# Patient Record
Sex: MALE | Employment: STUDENT | URBAN - METROPOLITAN AREA
[De-identification: names, ages, dates, MRNs, and addresses within clinical notes are randomized per-mention and may not be internally consistent; named-entity substitution may affect disease eponyms.]

---

## 2018-06-22 ENCOUNTER — HOSPITAL ENCOUNTER (OUTPATIENT)
Dept: GENERAL RADIOLOGY | Age: 23
Discharge: HOME | End: 2018-06-24
Payer: COMMERCIAL

## 2018-06-22 ENCOUNTER — HOSPITAL ENCOUNTER (OUTPATIENT)
Age: 23
Discharge: HOME | End: 2018-06-24
Payer: COMMERCIAL

## 2018-06-22 DIAGNOSIS — R76.11 NONSPECIFIC REACTION TO TUBERCULIN TEST: ICD-10-CM

## 2018-06-22 PROCEDURE — 71046 X-RAY EXAM CHEST 2 VIEWS: CPT

## 2021-02-28 ENCOUNTER — OFFICE VISIT (OUTPATIENT)
Dept: PRIMARY CARE CLINIC | Age: 26
End: 2021-02-28

## 2021-02-28 VITALS
HEART RATE: 114 BPM | BODY MASS INDEX: 36.18 KG/M2 | WEIGHT: 273 LBS | OXYGEN SATURATION: 98 % | SYSTOLIC BLOOD PRESSURE: 122 MMHG | TEMPERATURE: 97.1 F | HEIGHT: 73 IN | DIASTOLIC BLOOD PRESSURE: 74 MMHG

## 2021-02-28 DIAGNOSIS — R51.9 ACUTE NONINTRACTABLE HEADACHE, UNSPECIFIED HEADACHE TYPE: Primary | ICD-10-CM

## 2021-02-28 DIAGNOSIS — Z20.822 CLOSE EXPOSURE TO COVID-19 VIRUS: ICD-10-CM

## 2021-02-28 LAB
Lab: NORMAL
PERFORMING INSTRUMENT: NORMAL
QC PASS/FAIL: NORMAL
SARS-COV-2, POC: NORMAL

## 2021-02-28 PROCEDURE — 99213 OFFICE O/P EST LOW 20 MIN: CPT | Performed by: NURSE PRACTITIONER

## 2021-02-28 PROCEDURE — 87426 SARSCOV CORONAVIRUS AG IA: CPT | Performed by: NURSE PRACTITIONER

## 2021-02-28 RX ORDER — IBUPROFEN 800 MG/1
TABLET ORAL
COMMUNITY
Start: 2021-02-12 | End: 2021-04-04 | Stop reason: ALTCHOICE

## 2021-02-28 NOTE — PROGRESS NOTES
Chief Complaint   Headache and Concern For COVID-19 (+ exposure)    History of Present Illness   Source of history provided by:  patient. Milagros Lopez is a 30 y.o. old male who presents to express care with complaints of Headache and Patient was exposed to someone who is a known Covid-19 infected person or directly caring for such person x 2 days. States symptoms have improved since onset. Has been taking Ibuprofen with symptomatic relief. Currently denies any Fever, Cough, Shortness of breath, Nausea, Vomiting, Chest Pain, Abdominal Pain, Rash or Lethargy. Denies any hx of asthma. Denies tobacco use. ROS   Pertinent positives and negatives are stated within HPI, all other systems reviewed and are negative. Past Medical History:  has a past medical history of Emerson-Parkinson-White (WPW) syndrome. Past Surgical History:  has a past surgical history that includes Atrial ablation surgery. Social History:  reports that he has never smoked. He has never used smokeless tobacco. He reports current alcohol use. He reports that he does not use drugs. Family History: family history is not on file. Allergies: Patient has no known allergies. Physical Exam   Vital Signs:  /74   Pulse 114   Temp 97.1 °F (36.2 °C)   Ht 6' 1\" (1.854 m)   Wt 273 lb (123.8 kg)   SpO2 98%   BMI 36.02 kg/m²    Oxygen Saturation Interpretation: Normal.    Constitutional:  Alert, development consistent with age. NAD. Head:  NC/NT. Airway patent. Ears: TMs clear bilaterally. Canals without exudate or swelling bilaterally. Mouth: Posterior pharynx with mild erythema and clear postnasal drip. No tonsillar hypertrophy or exudate. Neck:  Normal ROM. Supple. No anterior cervical adenopathy noted. Lungs: CTAB without wheezes, rales, or rhonchi. CV:  Tachycardia with regular rhythm, normal heart sounds, without pathological murmurs, ectopy, gallops, or rubs. Skin:  Normal turgor. Warm, dry, without visible rash. Lymphatic: No lymphangitis or adenopathy noted. Neurological:  Oriented. Motor functions intact. Psych: Moderately anxious     Lab / Imaging Results   (All laboratory and radiology results have been personally reviewed by myself)  Labs:  Results for orders placed or performed in visit on 02/28/21   POCT COVID-19, Antigen   Result Value Ref Range    SARS-COV-2, POC Not-Detected Not Detected    Lot Number [id number]     QC Pass/Fail pass     Performing Instrument BD Veritor        Imaging: All Radiology results interpreted by Radiologist unless otherwise noted. No results found. Medical Decision Making   Pt non-toxic, in no apparent distress and stable at time of discharge. Assessment/Plan   Bishop Zhu was seen today for headache and concern for covid-19. Diagnoses and all orders for this visit:    Acute nonintractable headache, unspecified headache type        - Continue Ibuprofen as needed     Close exposure to COVID-19 virus  -     POCT COVID-19, Antigen  -     COVID-19; Future    Radid COVID negative. PCR COVID-19 swab obtained and pending, will call with results once available. Advised self-quarantine at home in the interim. Increase fluids and rest. Symptomatic relief discussed including Tylenol prn pain/fever. Schedule f/u with PCP in 7-10 days if symptoms persist. ED sooner if symptoms worsen or change. ED immediately with high or refractory fever, progressive SOB, dyspnea, CP, calf pain/swelling, shaking chills, vomiting, abdominal pain, lethargy, flank pain, or decreased urinary output. Pt verbalizes understanding and is in agreement with plan of care. All questions answered. Jong Ramos, APRN - CNP    This visit was provided as a focused evaluation during the COVID-19 pandemic/national emergency. A comprehensive review of all previous patient history and testing was not conducted. Pertinent findings were elicited during the visit. *NOTE: This report was transcribed using voice recognition software. Every effort was made to ensure accuracy; however, inadvertent computerized transcription errors may be present.

## 2021-03-01 DIAGNOSIS — Z20.822 CLOSE EXPOSURE TO COVID-19 VIRUS: ICD-10-CM

## 2021-03-01 LAB — SARS-COV-2, PCR: NOT DETECTED

## 2021-04-04 ENCOUNTER — APPOINTMENT (OUTPATIENT)
Dept: CT IMAGING | Age: 26
End: 2021-04-04
Payer: COMMERCIAL

## 2021-04-04 ENCOUNTER — HOSPITAL ENCOUNTER (EMERGENCY)
Age: 26
Discharge: HOME | End: 2021-04-04
Attending: FAMILY MEDICINE
Payer: COMMERCIAL

## 2021-04-04 VITALS
HEIGHT: 72 IN | TEMPERATURE: 96.6 F | OXYGEN SATURATION: 97 % | HEART RATE: 78 BPM | DIASTOLIC BLOOD PRESSURE: 79 MMHG | RESPIRATION RATE: 18 BRPM | BODY MASS INDEX: 36.57 KG/M2 | WEIGHT: 270 LBS | SYSTOLIC BLOOD PRESSURE: 122 MMHG

## 2021-04-04 DIAGNOSIS — R10.11 ABDOMINAL PAIN, RIGHT UPPER QUADRANT: Primary | ICD-10-CM

## 2021-04-04 LAB
ALBUMIN SERPL-MCNC: 4.4 G/DL (ref 3.5–5.2)
ALP BLD-CCNC: 83 U/L (ref 40–129)
ALT SERPL-CCNC: 69 U/L (ref 0–40)
ANION GAP SERPL CALCULATED.3IONS-SCNC: 12 MMOL/L (ref 7–16)
AST SERPL-CCNC: 31 U/L (ref 0–39)
BASOPHILS ABSOLUTE: 0.05 E9/L (ref 0–0.2)
BASOPHILS RELATIVE PERCENT: 0.6 % (ref 0–2)
BILIRUB SERPL-MCNC: 0.4 MG/DL (ref 0–1.2)
BILIRUBIN URINE: NEGATIVE
BLOOD, URINE: NEGATIVE
BUN BLDV-MCNC: 16 MG/DL (ref 6–20)
CALCIUM SERPL-MCNC: 8.9 MG/DL (ref 8.6–10.2)
CHLORIDE BLD-SCNC: 100 MMOL/L (ref 98–107)
CLARITY: CLEAR
CO2: 25 MMOL/L (ref 22–29)
COLOR: YELLOW
CREAT SERPL-MCNC: 1 MG/DL (ref 0.7–1.2)
EOSINOPHILS ABSOLUTE: 0.18 E9/L (ref 0.05–0.5)
EOSINOPHILS RELATIVE PERCENT: 2.2 % (ref 0–6)
GFR AFRICAN AMERICAN: >60
GFR NON-AFRICAN AMERICAN: >60 ML/MIN/1.73
GLUCOSE BLD-MCNC: 95 MG/DL (ref 74–99)
GLUCOSE URINE: NEGATIVE MG/DL
HCT VFR BLD CALC: 38.5 % (ref 37–54)
HEMOGLOBIN: 13.3 G/DL (ref 12.5–16.5)
IMMATURE GRANULOCYTES #: 0.02 E9/L
IMMATURE GRANULOCYTES %: 0.2 % (ref 0–5)
KETONES, URINE: ABNORMAL MG/DL
LACTIC ACID: 1.2 MMOL/L (ref 0.5–2.2)
LEUKOCYTE ESTERASE, URINE: NEGATIVE
LIPASE: 40 U/L (ref 13–60)
LYMPHOCYTES ABSOLUTE: 3.43 E9/L (ref 1.5–4)
LYMPHOCYTES RELATIVE PERCENT: 42.1 % (ref 20–42)
MCH RBC QN AUTO: 29.7 PG (ref 26–35)
MCHC RBC AUTO-ENTMCNC: 34.5 % (ref 32–34.5)
MCV RBC AUTO: 85.9 FL (ref 80–99.9)
MONOCYTES ABSOLUTE: 1.1 E9/L (ref 0.1–0.95)
MONOCYTES RELATIVE PERCENT: 13.5 % (ref 2–12)
NEUTROPHILS ABSOLUTE: 3.37 E9/L (ref 1.8–7.3)
NEUTROPHILS RELATIVE PERCENT: 41.4 % (ref 43–80)
NITRITE, URINE: NEGATIVE
PDW BLD-RTO: 12.2 FL (ref 11.5–15)
PH UA: 6 (ref 5–9)
PLATELET # BLD: 296 E9/L (ref 130–450)
PMV BLD AUTO: 10.3 FL (ref 7–12)
POTASSIUM SERPL-SCNC: 3.7 MMOL/L (ref 3.5–5)
PROTEIN UA: NEGATIVE MG/DL
RBC # BLD: 4.48 E12/L (ref 3.8–5.8)
SODIUM BLD-SCNC: 137 MMOL/L (ref 132–146)
SPECIFIC GRAVITY UA: 1.02 (ref 1–1.03)
TOTAL PROTEIN: 7.4 G/DL (ref 6.4–8.3)
UROBILINOGEN, URINE: 0.2 E.U./DL
WBC # BLD: 8.2 E9/L (ref 4.5–11.5)

## 2021-04-04 PROCEDURE — 83690 ASSAY OF LIPASE: CPT

## 2021-04-04 PROCEDURE — 99283 EMERGENCY DEPT VISIT LOW MDM: CPT

## 2021-04-04 PROCEDURE — C9113 INJ PANTOPRAZOLE SODIUM, VIA: HCPCS | Performed by: FAMILY MEDICINE

## 2021-04-04 PROCEDURE — 2580000003 HC RX 258: Performed by: FAMILY MEDICINE

## 2021-04-04 PROCEDURE — 36415 COLL VENOUS BLD VENIPUNCTURE: CPT

## 2021-04-04 PROCEDURE — 6360000002 HC RX W HCPCS: Performed by: FAMILY MEDICINE

## 2021-04-04 PROCEDURE — 81003 URINALYSIS AUTO W/O SCOPE: CPT

## 2021-04-04 PROCEDURE — 96374 THER/PROPH/DIAG INJ IV PUSH: CPT

## 2021-04-04 PROCEDURE — 6370000000 HC RX 637 (ALT 250 FOR IP): Performed by: FAMILY MEDICINE

## 2021-04-04 PROCEDURE — 83605 ASSAY OF LACTIC ACID: CPT

## 2021-04-04 PROCEDURE — 80053 COMPREHEN METABOLIC PANEL: CPT

## 2021-04-04 PROCEDURE — 85025 COMPLETE CBC W/AUTO DIFF WBC: CPT

## 2021-04-04 PROCEDURE — 74176 CT ABD & PELVIS W/O CONTRAST: CPT

## 2021-04-04 RX ORDER — PANTOPRAZOLE SODIUM 40 MG/10ML
40 INJECTION, POWDER, LYOPHILIZED, FOR SOLUTION INTRAVENOUS ONCE
Status: COMPLETED | OUTPATIENT
Start: 2021-04-04 | End: 2021-04-04

## 2021-04-04 RX ORDER — LIDOCAINE HYDROCHLORIDE 20 MG/ML
5 SOLUTION OROPHARYNGEAL
Qty: 100 ML | Refills: 0 | Status: SHIPPED | OUTPATIENT
Start: 2021-04-04 | End: 2022-03-31

## 2021-04-04 RX ORDER — SUCRALFATE 1 G/1
1 TABLET ORAL 2 TIMES DAILY
Qty: 60 TABLET | Refills: 0 | Status: SHIPPED | OUTPATIENT
Start: 2021-04-04 | End: 2022-03-31

## 2021-04-04 RX ORDER — 0.9 % SODIUM CHLORIDE 0.9 %
1000 INTRAVENOUS SOLUTION INTRAVENOUS ONCE
Status: COMPLETED | OUTPATIENT
Start: 2021-04-04 | End: 2021-04-04

## 2021-04-04 RX ORDER — ACETAMINOPHEN 500 MG
1000 TABLET ORAL EVERY 8 HOURS PRN
Qty: 50 TABLET | Refills: 0 | Status: SHIPPED | OUTPATIENT
Start: 2021-04-04 | End: 2022-03-31

## 2021-04-04 RX ORDER — IBUPROFEN 400 MG/1
400 TABLET ORAL EVERY 8 HOURS PRN
Qty: 12 TABLET | Refills: 0 | Status: SHIPPED | OUTPATIENT
Start: 2021-04-04 | End: 2022-03-31 | Stop reason: ALTCHOICE

## 2021-04-04 RX ORDER — PANTOPRAZOLE SODIUM 40 MG/1
40 TABLET, DELAYED RELEASE ORAL
Qty: 30 TABLET | Refills: 0 | Status: SHIPPED | OUTPATIENT
Start: 2021-04-04 | End: 2022-07-20 | Stop reason: SDUPTHER

## 2021-04-04 RX ADMIN — SODIUM CHLORIDE 1000 ML: 9 INJECTION, SOLUTION INTRAVENOUS at 07:38

## 2021-04-04 RX ADMIN — PANTOPRAZOLE SODIUM 40 MG: 40 INJECTION, POWDER, FOR SOLUTION INTRAVENOUS at 07:38

## 2021-04-04 RX ADMIN — LIDOCAINE HYDROCHLORIDE: 20 SOLUTION ORAL; TOPICAL at 07:38

## 2021-04-04 RX ADMIN — SODIUM CHLORIDE 1000 ML: 9 INJECTION, SOLUTION INTRAVENOUS at 08:42

## 2021-04-04 ASSESSMENT — PAIN DESCRIPTION - PAIN TYPE: TYPE: ACUTE PAIN

## 2021-04-04 ASSESSMENT — PAIN DESCRIPTION - LOCATION: LOCATION: ABDOMEN

## 2021-04-04 ASSESSMENT — PAIN SCALES - GENERAL: PAINLEVEL_OUTOF10: 3

## 2021-04-04 ASSESSMENT — PAIN DESCRIPTION - ORIENTATION: ORIENTATION: RIGHT;UPPER

## 2021-04-04 NOTE — ED PROVIDER NOTES
[address] [facility]  Department of Emergency Medicine   ED  Encounter Note  Admit Date/RoomTime: 2021  6:58 AM  ED Room:     NAME: Sarika Gill  : 1995  MRN: [id number]     Chief Complaint:  Abdominal Pain (left upper abd pain since yesterday. stool has been black x 2-3 days. has been taking ibuprofen for root canal.  no vomiting)    History of Present Illness        Sarika Gill is a 30 y.o. old male who presents to the emergency department by private vehicle, for gradual onset aching pain in the RUQ without radiation which began 2 day(s) prior to arrival.  There has been no similar episodes in the past however patient does admit he has had heartburn in the past and even before taking ibuprofen 800 which he takes 3-4 times a day since mid March. Patient has noticed black stools in the past 2 to 3 days no nausea vomiting or diarrhea no chest pain. No cough fever chills. .   Since onset the symptoms have been persistent. The pain is associated with nothing additional.  The pain is aggravated by eating, NSAIDs and pressure on area of discomfort and relieved by nothing. There has been NO back pain, dysuria, hematuria or vomiting. Rebekah Yao ROS   Pertinent positives and negatives are stated within HPI, all other systems reviewed and are negative. Past Medical History:  has a past medical history of Emerson-Parkinson-White (WPW) syndrome. Surgical History:  has a past surgical history that includes Atrial ablation surgery. Social History:  reports that he has never smoked. He has never used smokeless tobacco. He reports current alcohol use. He reports that he does not use drugs. Family History: family history is not on file. Allergies: Patient has no known allergies.     Physical Exam   Oxygen Saturation Interpretation: Normal.        ED Triage Vitals   BP Temp Temp Source Pulse Resp SpO2 Height Weight   21 0703 21 0703 21 0703 21 0703 21 0703 04/04/21 0703 04/04/21 0701 04/04/21 0701   124/65 96.6 °F (35.9 °C) Temporal 86 16 98 % 6' (1.829 m) 270 lb (122.5 kg)         General Appearance/Constitutional:  Alert, development consistent with age. HEENT:  NC/NT. PERRLA. Airway patent. Neck:  Supple. No lymphadenopathy. Respiratory: Lungs Clear to auscultation and breath sounds equal.  CV:  Regular rate and rhythm. GI:  normal appearing, non-distended with no visible hernias. Bowel sounds: normal bowel sounds. Tenderness: There is mild tenderness present - located in the RUQ., There is no rebound tenderness. , There is no guarding. , There is no distension. .           Liver: non-palpable. Spleen:  non-tender. Rectal: No hemorrhoids no fissures no masses examined guaiac negative no bloody or dark stool noted. Back: CVA Tenderness: No.  Integument:  Normal turgor. Warm, dry, without visible rash, unless noted elsewhere. Neurological:  Orientation age-appropriate. Motor functions intact.     Lab / Imaging Results   (All laboratory and radiology results have been personally reviewed by myself)  Labs:  Results for orders placed or performed during the hospital encounter of 04/04/21   CBC Auto Differential   Result Value Ref Range    WBC 8.2 4.5 - 11.5 E9/L    RBC 4.48 3.80 - 5.80 E12/L    Hemoglobin 13.3 12.5 - 16.5 g/dL    Hematocrit 38.5 37.0 - 54.0 %    MCV 85.9 80.0 - 99.9 fL    MCH 29.7 26.0 - 35.0 pg    MCHC 34.5 32.0 - 34.5 %    RDW 12.2 11.5 - 15.0 fL    Platelets 502 166 - 033 E9/L    MPV 10.3 7.0 - 12.0 fL    Neutrophils % 41.4 (L) 43.0 - 80.0 %    Immature Granulocytes % 0.2 0.0 - 5.0 %    Lymphocytes % 42.1 (H) 20.0 - 42.0 %    Monocytes % 13.5 (H) 2.0 - 12.0 %    Eosinophils % 2.2 0.0 - 6.0 %    Basophils % 0.6 0.0 - 2.0 %    Neutrophils Absolute 3.37 1.80 - 7.30 E9/L    Immature Granulocytes # 0.02 E9/L    Lymphocytes Absolute 3.43 1.50 - 4.00 E9/L    Monocytes Absolute 1.10 (H) 0.10 - 0.95 E9/L inadvertent computerized transcription errors may be present.   END OF PROVIDER NOTE        Tunde Monsalve MD  04/04/21 [id number]

## 2021-08-27 ENCOUNTER — HOSPITAL ENCOUNTER (EMERGENCY)
Age: 26
Discharge: HOME | End: 2021-08-27
Attending: EMERGENCY MEDICINE
Payer: COMMERCIAL

## 2021-08-27 VITALS
SYSTOLIC BLOOD PRESSURE: 129 MMHG | RESPIRATION RATE: 18 BRPM | DIASTOLIC BLOOD PRESSURE: 82 MMHG | OXYGEN SATURATION: 98 % | HEART RATE: 74 BPM | TEMPERATURE: 97.6 F

## 2021-08-27 DIAGNOSIS — S61.012A LACERATION OF LEFT THUMB WITHOUT FOREIGN BODY WITHOUT DAMAGE TO NAIL, INITIAL ENCOUNTER: Primary | ICD-10-CM

## 2021-08-27 PROCEDURE — 90471 IMMUNIZATION ADMIN: CPT | Performed by: EMERGENCY MEDICINE

## 2021-08-27 PROCEDURE — 90715 TDAP VACCINE 7 YRS/> IM: CPT | Performed by: EMERGENCY MEDICINE

## 2021-08-27 PROCEDURE — 6360000002 HC RX W HCPCS: Performed by: EMERGENCY MEDICINE

## 2021-08-27 PROCEDURE — 99283 EMERGENCY DEPT VISIT LOW MDM: CPT

## 2021-08-27 RX ADMIN — TETANUS TOXOID, REDUCED DIPHTHERIA TOXOID AND ACELLULAR PERTUSSIS VACCINE, ADSORBED 0.5 ML: 5; 2.5; 8; 8; 2.5 SUSPENSION INTRAMUSCULAR at 02:46

## 2021-08-27 NOTE — ED PROVIDER NOTES
HPI:  8/27/21, Time: 2:25 AM EDT         Devin Hernandez is a 27 y.o. male presenting to the ED for left thumb laceration occurring on Sunday. Patient states that he accidentally cut his thumb on a mirror. He did not seek medical treatment at that time. States that the wound has been healing well, but he came to the ED because he needs a tetanus vaccine. States he has minimal pain, no drainage, and no fevers. No numbness or tingling. Review of Systems:   Pertinent positives and negatives are stated within HPI, all other systems reviewed and are negative.          --------------------------------------------- PAST HISTORY ---------------------------------------------  Past Medical History:  has a past medical history of Emerson-Parkinson-White (WPW) syndrome. Past Surgical History:  has a past surgical history that includes Atrial ablation surgery. Social History:  reports that he has never smoked. He has never used smokeless tobacco. He reports current alcohol use. He reports that he does not use drugs. Family History: family history is not on file. The patients home medications have been reviewed. Allergies: Patient has no known allergies. -------------------------------------------------- RESULTS -------------------------------------------------  All laboratory and radiology results have been personally reviewed by myself   LABS:  No results found for this visit on 08/27/21. RADIOLOGY:  Interpreted by Radiologist.  No orders to display       ------------------------- NURSING NOTES AND VITALS REVIEWED ---------------------------   The nursing notes within the ED encounter and vital signs as below have been reviewed.    /76   Pulse 80   Temp 97.6 °F (36.4 °C) (Temporal)   Resp 16   SpO2 98%   Oxygen Saturation Interpretation: Normal      ---------------------------------------------------PHYSICAL EXAM--------------------------------------      Constitutional/General: Alert and oriented x3, well appearing, non toxic in NAD  Head: Normocephalic and atraumatic  Mouth: Oropharynx clear, handling secretions, no trismus  Neck: Supple, full ROM,   Pulmonary: Not in respiratory distress  Extremities: Moves all extremities x 4. Warm and well perfused. Left thumb-very small superficial laceration to palmar aspect of thumb which is healing well, no erythema or drainage, no crepitus, no palpable foreign body, good capillary refill, flexor and extensor tendons intact, normal sensation. Skin: warm and dry without rash  Neurologic: GCS 15, no focal motor or sensory deficits   Psych: Normal Affect. Behavior normal.      ------------------------------ ED COURSE/MEDICAL DECISION MAKING----------------------  Medications   Tetanus-Diphth-Acell Pertussis (BOOSTRIX) injection 0.5 mL (has no administration in time range)       Medical Decision Making/ED COURSE:   Patient is a 77-year-old male presenting to the ED for tetanus vaccine after sustaining a superficial laceration to his left thumb 4 days ago. On examination, there was no palpable foreign body and no evidence of infection. He will be given a tetanus vaccine. Supportive care measures and ED return precautions discussed. Patient remained hemodynamically stable throughout ED course. Counseling: The emergency provider has spoken with the patient and discussed todays results, in addition to providing specific details for the plan of care and counseling regarding the diagnosis and prognosis. Questions are answered at this time and they are agreeable with the plan.      --------------------------------- IMPRESSION AND DISPOSITION ---------------------------------    IMPRESSION  1. Laceration of left thumb without foreign body without damage to nail, initial encounter        DISPOSITION  Disposition: Discharge to home  Patient condition is stable      NOTE: This report was transcribed using voice recognition software.  Every effort was made to ensure accuracy; however, inadvertent computerized transcription errors may be present    Miley DIAZ MD, am the primary provider of this record       Miley Mendoza MD  08/27/21 [id number]

## 2022-03-31 ENCOUNTER — TELEPHONE (OUTPATIENT)
Dept: FAMILY MEDICINE CLINIC | Age: 27
End: 2022-03-31

## 2022-03-31 ENCOUNTER — OFFICE VISIT (OUTPATIENT)
Dept: FAMILY MEDICINE CLINIC | Age: 27
End: 2022-03-31
Payer: COMMERCIAL

## 2022-03-31 VITALS
RESPIRATION RATE: 20 BRPM | HEIGHT: 72 IN | SYSTOLIC BLOOD PRESSURE: 122 MMHG | OXYGEN SATURATION: 98 % | DIASTOLIC BLOOD PRESSURE: 87 MMHG | BODY MASS INDEX: 34.81 KG/M2 | WEIGHT: 257 LBS | HEART RATE: 100 BPM | TEMPERATURE: 97.6 F

## 2022-03-31 DIAGNOSIS — F41.9 ANXIETY: Primary | ICD-10-CM

## 2022-03-31 DIAGNOSIS — R74.8 ABNORMAL AST AND ALT: ICD-10-CM

## 2022-03-31 PROBLEM — E66.9 OBESITY, CLASS I, BMI 30-34.9: Status: ACTIVE | Noted: 2021-11-24

## 2022-03-31 PROBLEM — F41.0 PANIC ATTACKS: Status: ACTIVE | Noted: 2022-03-31

## 2022-03-31 PROBLEM — E66.811 OBESITY, CLASS I, BMI 30-34.9: Status: ACTIVE | Noted: 2021-11-24

## 2022-03-31 PROCEDURE — 99204 OFFICE O/P NEW MOD 45 MIN: CPT | Performed by: STUDENT IN AN ORGANIZED HEALTH CARE EDUCATION/TRAINING PROGRAM

## 2022-03-31 RX ORDER — FLUOXETINE HYDROCHLORIDE 20 MG/1
20 CAPSULE ORAL DAILY
Qty: 30 CAPSULE | Refills: 1 | Status: SHIPPED
Start: 2022-03-31 | End: 2022-03-31

## 2022-03-31 RX ORDER — HYDROXYZINE PAMOATE 25 MG/1
25 CAPSULE ORAL 3 TIMES DAILY PRN
Qty: 60 CAPSULE | Refills: 0 | Status: SHIPPED | OUTPATIENT
Start: 2022-03-31 | End: 2022-04-30

## 2022-03-31 RX ORDER — FLUOXETINE HYDROCHLORIDE 40 MG/1
40 CAPSULE ORAL DAILY
Qty: 30 CAPSULE | Refills: 3 | Status: SHIPPED
Start: 2022-03-31 | End: 2022-03-31

## 2022-03-31 SDOH — ECONOMIC STABILITY: FOOD INSECURITY: WITHIN THE PAST 12 MONTHS, YOU WORRIED THAT YOUR FOOD WOULD RUN OUT BEFORE YOU GOT MONEY TO BUY MORE.: NEVER TRUE

## 2022-03-31 SDOH — ECONOMIC STABILITY: FOOD INSECURITY: WITHIN THE PAST 12 MONTHS, THE FOOD YOU BOUGHT JUST DIDN'T LAST AND YOU DIDN'T HAVE MONEY TO GET MORE.: NEVER TRUE

## 2022-03-31 ASSESSMENT — PATIENT HEALTH QUESTIONNAIRE - PHQ9
SUM OF ALL RESPONSES TO PHQ QUESTIONS 1-9: 0
2. FEELING DOWN, DEPRESSED OR HOPELESS: 0
SUM OF ALL RESPONSES TO PHQ QUESTIONS 1-9: 0
1. LITTLE INTEREST OR PLEASURE IN DOING THINGS: 0
SUM OF ALL RESPONSES TO PHQ QUESTIONS 1-9: 0
SUM OF ALL RESPONSES TO PHQ QUESTIONS 1-9: 0
SUM OF ALL RESPONSES TO PHQ9 QUESTIONS 1 & 2: 0

## 2022-03-31 ASSESSMENT — ENCOUNTER SYMPTOMS
SHORTNESS OF BREATH: 0
ABDOMINAL PAIN: 0
TROUBLE SWALLOWING: 0

## 2022-03-31 ASSESSMENT — SOCIAL DETERMINANTS OF HEALTH (SDOH): HOW HARD IS IT FOR YOU TO PAY FOR THE VERY BASICS LIKE FOOD, HOUSING, MEDICAL CARE, AND HEATING?: NOT HARD AT ALL

## 2022-03-31 NOTE — PATIENT INSTRUCTIONS
Patient Education        Learning About Mindfulness for Stress  What are mindfulness and stress? Stress is what you feel when you have to handle more than you are used to. A lot of things can cause stress. You may feel stress when you go on a job interview, take a test, or run a race. This kind of short-term stress is normal and even useful. It can help you if you need to work hard or react quickly. Stress also can last a long time. Long-term stress is caused by stressful situations or events. Examples of long-term stress include long-term health problems, ongoing problems at work, and conflicts in your family. Long-term stress can harm your health. Mindfulness is a focus only on things happening in the present moment. It's a process of purposefully paying attention to and being aware of your surroundings, your emotions, your thoughts, and how your body feels. You are aware of these things, but you aren't judging these experiences as \"good\" or \"bad. \" Mindfulness can help you learn to calm your mind and body to help you cope with illness, pain, and stress. How does mindfulness help to relieve stress? Mindfulness can help quiet your mind and relax your body. Studies show that it can help some people sleep better, feel less anxious, and bring their blood pressure down. And it's been shown to help some people live and cope better with certain health problems like heart disease, depression, chronic pain, and cancer. How do you practice mindfulness? To be mindful is to pay attention, to be present, and to be accepting. · When you're mindful, you do just one thing and you pay close attention to that one thing. For example, you may sit quietly and notice your emotions or how your food tastes and smells. · When you're present, you focus on the things that are happening right now. You let go of your thoughts about the past and the future.  When you dwell on the past or the future, you miss moments that can heal and strengthen you. You may miss moments like hearing a child laugh or seeing a friendly face when you think you're all alone. · When you're accepting, you don't  the present moment. Instead you accept your thoughts and feelings as they come. You can practice anytime, anywhere, and in any way you choose. You can practice in many ways. Here are a few ideas:  · While doing your chores, like washing the dishes, let your mind focus on what's in your hand. What does the dish feel like? Is the water warm or cold? · Go outside and take a few deep breaths. What is the air like? Is it warm or cold? · When you can, take some time at the start of your day to sit alone and think. · Take a slow walk by yourself. Count your steps while you breathe in and out. · Try yoga breathing exercises, stretches, and poses to strengthen and relax your muscles. · At work, if you can, try to stop for a few moments each hour. Note how your body feels. Let yourself regroup and let your mind settle before you return to what you were doing. · If you struggle with anxiety or \"worry thoughts,\" imagine your mind as a blue rivas and your worry thoughts as clouds. Now imagine those worry thoughts floating across your mind's rivas. Just let them pass by as you watch. Follow-up care is a key part of your treatment and safety. Be sure to make and go to all appointments, and call your doctor if you are having problems. It's also a good idea to know your test results and keep a list of the medicines you take. Where can you learn more? Go to [email or web address]. org and sign in to your [employer] account. Enter Z668 in the [employer] box to learn more about \"Learning About Mindfulness for Stress. \"     If you do not have an account, please click on the \"Sign Up Now\" link. Current as of: August 31, 2020               Content Version: 12.9  © 3054-0163 Healthwise, Incorporated.    Care instructions adapted under license by [facility] [employer] [facility] ([facility]). If you have questions about a medical condition or this instruction, always ask your healthcare professional. [facility] any warranty or liability for your use of this information. Patient Education         Mindfulness: Breathing Practice (03:44)  Your health professional recommends that you watch this short online health video. Practice mindfulness to help reduce stress. Purpose:  Outlines how to practice mindfulness by focusing on breathing to help reduce stress. Goal:  The user will practice a mindfulness technique for stress reduction that focuses on breath awareness. How to watch the video    Scan the QR code   OR Visit the website    [email or web address]   Current as of: September 23, 2020               Content Version: 12.9  © 2006-2021 [employer]. Care instructions adapted under license by [facility] [employer] [facility] ([facility]). If you have questions about a medical condition or this instruction, always ask your healthcare professional. [facility] [address] any warranty or liability for your use of this information. Learning About Guided Imagery for Stress and Trouble Sleeping  What are guided imagery and stress? Stress is what you feel when you have to handle more than you're used to. A lot of things can cause stress. You may feel stress when you go on a job interview, take a test, or run a race. This kind of short-term stress is normal and even useful. It can help you if you need to work hard or react quickly. Stress also can last a long time. Long-term stress is caused by stressful situations or events. Examples include long-term health problems, ongoing problems at work, and conflicts in your family. Long-term stress can harm your health. Guided imagery is a technique of directed thoughts and suggestions that guide your mind toward a relaxed, focused state.  This technique helps you use your mind to take you to a calm, peaceful place. You can use it to relax, which can lower blood pressure and reduce other problems related to stress. How does guided imagery help to relieve stress? Because of the way the mind and body are connected, guided imagery can make you feel like you are experiencing something just by imagining it. You can achieve a relaxed state when you imagine all the details of a safe, comfortable place, such as a beach or a garden. This relaxed state may help you feel more in control of your emotions and thought processes. Feeling in control may improve your attitude, health, and sense of well-being. How do you do guided imagery? You can use a smartphone kasey or a video to lead you through the process. You use all of your senses in guided imagery. For example, if you want a tropical setting, you can imagine the warm breeze on your skin, the bright blue of the water, the sound of the surf, the sweet scent of tropical flowers, and the taste of coconut. Imagining those things can make you actually feel like you're there. But you don't have to imagine the tropics to feel peace. Guided imagery can take you to your own restful place. To give guided imagery a try, follow these steps:  1. Lean back comfortably in your chair. If you can, close your eyes. Put your arms on the armrests, or fold your hands in your lap. 2. Take a deep breath through your nose. Breathe in slowly, and then let the air out completely through your mouth. 3. Do it again slowly. Keep breathing like this. Gather up any tension in your body, and send it out with every breath. 4. Let a feeling of warmth spread from your lungs to your neck and head, down your arms to your fingertips, through your body and into your legs, all the way down to your toes. Stay this way for a moment. 5. Now imagine a pleasant day. You're at a park or at a place you've visited in the past where you felt at peace. 6. In your mind's eye, look at what lies in front of you.  Maybe you see the sun, filtered through trees. Maybe clouds are drifting by.  7. Look to one side, and then the other. Notice the feel of the air around you. Notice how it feels on your face and on your arms. 8. Stay here for a while. Let it become real for you. Patient Education         Relaxation Exercise: Guided Imagery (02:38)  Your health professional recommends that you watch this short online health video. Learn how to take your mind to a calming beach where stress melts away. Purpose:  Leads patients through a guided imagery exercise for stress reduction. Goal:  The user will be led through a guided imagery exercise to reduce stress. How to watch the video    Scan the QR code   OR Visit the website    [email or web address]   Current as of: September 23, 2020               Content Version: 12.9  © 2006-2021 Healthwise, [employer]. Care instructions adapted under license by [facility] ([facility]). If you have questions about a medical condition or this instruction, always ask your healthcare professional. [place] [address] any warranty or liability for your use of this information. Patient Education        Learning About Progressive Muscle Relaxation for Stress  What are progressive muscle relaxation and stress? Stress is what you feel when you have to handle more than you are used to. A lot of things can cause stress. You may feel stress when you go on a job interview, take a test, or run a race. This kind of short-term stress is normal and even useful. It can help you if you need to work hard or react quickly. Stress also can last a long time. Long-term stress is caused by stressful situations or events. Examples of long-term stress include long-term health problems, ongoing problems at work, and conflicts in your family. Long-term stress can harm your health. When you have anxiety or stress in your life, one of the ways your body responds is with muscle tension.  Progressive muscle relaxation is a method that helps relieve that tension. How does progressive muscle relaxation reduce stress? The body responds to stress with muscle tension, which can cause pain or discomfort. In turn, tense muscles relay to the body that it's stressed. That keeps the cycle of stress and muscle tension going. Progressive muscle relaxation helps break this cycle by reducing muscle tension and general mental anxiety. This method often helps people get to sleep. How do you do progressive muscle relaxation? To do progressive muscle relaxation, first you tense a group of muscles as you breathe in. Then you relax them as you breathe out. Notice how your muscles feel when you relax them. You work on your muscle groups in a certain order. Through practice, you can learn to feel the difference between a tensed muscle and a relaxed muscle. Then you can learn how to turn on this relaxed state at the first sign of a muscle tensing up due to stress. Practicing this method for a few weeks will help you get better at this skill. In time you'll be able to use this method to relieve stress. When you first start, it may help to use an audio recording until you learn all the muscle groups in order. Check online for these recordings. Choose a place where you won't be interrupted. It should have space for you to lie down on your back and stretch out comfortably, such as on a carpeted floor. Progressive Muscle Relaxation Script  Sit back or lie down in a comfortable position. Close your eyes if you are comfortable doing so. Begin by taking a deep breath and noticing the feeling of air filling your lungs. Hold your breath for a few seconds. Again slowly release the air. Even slower now, take another breath. Fill your lungs and hold the air. Slowly release the breath and imagine the feeling of tension leaving your body. Now, move your attention to your feet.   Begin to tense your feet by curling your toes and the arch of your foot. Hold onto the tension and notice what it feels like. Hold for 5 seconds. Release the tension in your foot. Noticed a new feeling of relaxation. Next, begin to focus on your lower leg. Tense the muscles in your calves. Hold them tightly and pay attention to the feeling of tension. Hold for 5 seconds. Release the tension from your lower legs. Again, notice a feeling of relaxation. Remember to continue taking deep breaths. Next, tense the muscles of your upper leg and pelvis. You can do this by tightly squeezing your thighs together. Make sure you feel tenseness without going to the point of strain. Hold for 5 seconds. And release. Feel the tension leave your muscles. Begin to tense your stomach and chest.  You can do this by sucking your stomach in. Squeeze harder and hold the tension. Hold for 5 seconds. Release the tension. Allow your body to go limp. Let yourself notice a feeling of relaxation. Continue taking deep breaths. Breathing slowly, noticed air fill your lungs, and hold it. Released to air slowly. Feel it leaving your lungs. Next, tense muscles in your back by bringing her shoulders together behind you. Hold them tightly. Tense as hard to can without straining and keep holding. Hold for 5 seconds. Release the tension from your back. Feel the tension slowly leaving your body, and the new feeling of relaxation. Noticed how different your body feels when you allow it to relax. Tense your arms all the way from your hands to your shoulders. Make a fist and squeeze all the way up to your arm. Hold it for 5 seconds. Release the tension from your arms and shoulders. Notice a feeling of relaxation in your fingers, hands, arms, and shoulders. Noticed how your arms feel limp and at ease. Move up to your neck and your head. Tense your face and your neck by distorting the muscles around your eyes and mouth.   Hold for 5 seconds  Release the tension. Again, noticed a new feeling of relaxation. Finally tense your entire body. Tense your feet, legs, stomach, chest, arms, head and neck. Tense harder, without straining. Hold the tension. Now release. Allow your body to go limp. Pay attention to feeling of relaxation, and how different it is from the feeling of tension. Begin to wake up your body by slowly moving your muscles. Adjust your arms and legs. Stretch your muscles and open your eyes when you are ready. Follow-up care is a key part of your treatment and safety. Be sure to make and go to all appointments, and call your doctor if you are having problems. It's also a good idea to know your test results and keep a list of the medicines you take. Patient Education         Stress Management: Progressive Muscle Relaxation (04:38)  Your health professional recommends that you watch this short online health video. Learn how to reduce stress by tensing and relaxing your muscles. Purpose:  Guides viewer through progressive muscle relaxation as a means of reducing stress. Goal:  The user will learn how to reduce stress through tensing and relaxing each muscle group. How to watch the video    Scan the QR code   OR Visit the website    [email or web address]   Current as of: August 31, 2020               Content Version: 12.9  © 2006-2021 Healthwise, Incorporated. Care instructions adapted under license by [facility] ([facility]). If you have questions about a medical condition or this instruction, always ask your healthcare professional. [place] [address] any warranty or liability for your use of this information.

## 2022-03-31 NOTE — PROGRESS NOTES
Bonita Severe (:  1995) is a 27 y.o. male, New patient , here for evaluation of the following:  Establish Care (Wants to discuss take anxiety, stress. )         ASSESSMENT/PLAN  Patient is here to establish care, significant anxiety, will start on Prozac, addendum: Patient actually taking Zoloft in the past, will restart this medicine, discontinue Prozac will readdress his other concerns after he is feeling less anxious, is following with [facility] gastroenterology for diagnosis of Crohn's versus gallbladder disease    1. Anxiety  Chronic, has not been on medication in a while, significant anxiety about work, home, his family, will trial Prozac, start with half a pill, can consider augmentation in 1 month, no SI or HI  Zoloft, 50 mg  2. Abnormal AST and ALT  Elevation in ALT alone, drinks on the weekends, notes he thinks he has had negative hep C screen in the past following with gastroenterology    Return in about 4 weeks (around 2022) for Follow up on new med. Subjective   SUBJECTIVE/OBJECTIVE:  HPI  Is here to establish care. Patient is  with high anxiety  He is seeing GI, chrons versus GB issues, has pelvic MRI scheduled with [facility]. Has had labs through [facility], chronic elevation of ALT, will hold off on repeating labs    Declined preventative screening identified as care gaps unless ordered through this visit    PHQ2/PHQ9  PHQ-2 Score: 0  PHQ-2 Over the past 2 weeks, how often have you been bothered by any of the following problems? Little interest or pleasure in doing things: Not at all  Feeling down, depressed, or hopeless: Not at all  PHQ-2 Score: 0   PHQ-9 Total Score: 0 (3/31/2022  1:53 PM)       Past Medical History:  has a past medical history of Emerson-Parkinson-White (WPW) syndrome. Past Surgical History:  has a past surgical history that includes Atrial ablation surgery. Social History:  reports that he has never smoked.  He has never used smokeless tobacco. He reports current alcohol use. He reports that he does not use drugs. Family History: family history is not on file. Allergies: Patient has no known allergies. Medications:   Current Outpatient Medications   Medication Sig Dispense Refill    hydrOXYzine (VISTARIL) 25 MG capsule Take 1 capsule by mouth 3 times daily as needed for Anxiety (or insomina) 60 capsule 0    FLUoxetine (PROZAC) 40 MG capsule Take 1 capsule by mouth daily 30 capsule 3    pantoprazole (PROTONIX) 40 MG tablet Take 1 tablet by mouth every morning (before breakfast) 30 tablet 0     No current facility-administered medications for this visit. Allergies: Patient has no known allergies. Review of Systems   Constitutional: Negative for activity change, appetite change, fatigue, fever and unexpected weight change. HENT: Negative for trouble swallowing. Eyes: Negative for visual disturbance. Respiratory: Negative for shortness of breath. Cardiovascular: Negative for chest pain. Gastrointestinal: Negative for abdominal pain. Musculoskeletal: Negative for arthralgias. Neurological: Negative for weakness, light-headedness and headaches. Psychiatric/Behavioral: Negative for confusion and sleep disturbance. The patient is nervous/anxious. All other systems reviewed and are negative.          Objective   /87 (Site: Left Upper Arm, Position: Sitting, Cuff Size: Large Adult)   Pulse 100   Temp 97.6 °F (36.4 °C) (Temporal)   Resp 20   Ht 6' (1.829 m)   Wt 257 lb (116.6 kg)   SpO2 98%   BMI 34.86 kg/m²       No results found for: LABA1C  No results found for: CHOL  No results found for: TRIG  No results found for: HDL  No results found for: LDLCHOLESTEROL, LDLCALC  No results found for: LABVLDL, VLDL  No results found for: CHOLHDLRATIO   CREATININE   Date Value Ref Range Status   04/04/2021 1.0 0.7 - 1.2 mg/dL Final   06/29/2017 0.9 0.7 - 1.2 mg/dL Final   11/29/2016 1.2 0.7 - 1.2 mg/dL Final The ASCVD Risk score (Bakari Espino et al., 2013) failed to calculate for the following reasons: The 2013 ASCVD risk score is only valid for ages 36 to 78     Physical Exam  Constitutional:       General: He is not in acute distress. Appearance: Normal appearance. HENT:      Head: Normocephalic and atraumatic. Right Ear: External ear normal.      Left Ear: External ear normal.      Nose: Nose normal.      Mouth/Throat:      Mouth: Mucous membranes are moist.   Eyes:      Extraocular Movements: Extraocular movements intact. Conjunctiva/sclera: Conjunctivae normal.   Cardiovascular:      Rate and Rhythm: Normal rate and regular rhythm. Heart sounds: No murmur heard. Pulmonary:      Effort: Pulmonary effort is normal.      Breath sounds: Normal breath sounds. No wheezing. Musculoskeletal:         General: Normal range of motion. Neurological:      General: No focal deficit present. Mental Status: He is alert. Psychiatric:         Attention and Perception: Attention and perception normal.         Mood and Affect: Affect normal. Mood is anxious. Speech: Speech is rapid and pressured. Behavior: Behavior normal. Behavior is cooperative. Thought Content: Thought content normal. Thought content is not delusional. Thought content does not include homicidal or suicidal plan. Cognition and Memory: Cognition normal.         Judgment: Judgment normal.             An electronic signature was used to authenticate this note. --Carl Thomas MD       *NOTE: This report was transcribed using voice recognition software. Every effort was made to ensure accuracy; however, inadvertent computerized transcription errors may be present.

## 2022-06-01 ENCOUNTER — TELEPHONE (OUTPATIENT)
Dept: FAMILY MEDICINE CLINIC | Age: 27
End: 2022-06-01

## 2022-06-01 DIAGNOSIS — F41.9 ANXIETY: ICD-10-CM

## 2022-07-20 ENCOUNTER — OFFICE VISIT (OUTPATIENT)
Dept: FAMILY MEDICINE CLINIC | Age: 27
End: 2022-07-20
Payer: COMMERCIAL

## 2022-07-20 VITALS
OXYGEN SATURATION: 98 % | TEMPERATURE: 97.1 F | HEART RATE: 97 BPM | RESPIRATION RATE: 20 BRPM | SYSTOLIC BLOOD PRESSURE: 121 MMHG | WEIGHT: 277 LBS | HEIGHT: 72 IN | DIASTOLIC BLOOD PRESSURE: 78 MMHG | BODY MASS INDEX: 37.52 KG/M2

## 2022-07-20 DIAGNOSIS — F41.9 ANXIETY: Primary | ICD-10-CM

## 2022-07-20 DIAGNOSIS — H60.392 OTHER INFECTIVE ACUTE OTITIS EXTERNA OF LEFT EAR: ICD-10-CM

## 2022-07-20 DIAGNOSIS — H60.391 OTHER INFECTIVE ACUTE OTITIS EXTERNA OF RIGHT EAR: ICD-10-CM

## 2022-07-20 DIAGNOSIS — I45.6 WPW (WOLFF-PARKINSON-WHITE SYNDROME): ICD-10-CM

## 2022-07-20 DIAGNOSIS — K30 INDIGESTION: ICD-10-CM

## 2022-07-20 PROBLEM — I47.10 SVT (SUPRAVENTRICULAR TACHYCARDIA): Status: ACTIVE | Noted: 2018-05-09

## 2022-07-20 PROBLEM — I47.1 SVT (SUPRAVENTRICULAR TACHYCARDIA) (HCC): Status: ACTIVE | Noted: 2018-05-09

## 2022-07-20 PROCEDURE — 99213 OFFICE O/P EST LOW 20 MIN: CPT | Performed by: STUDENT IN AN ORGANIZED HEALTH CARE EDUCATION/TRAINING PROGRAM

## 2022-07-20 RX ORDER — PANTOPRAZOLE SODIUM 40 MG/1
40 TABLET, DELAYED RELEASE ORAL
Qty: 30 TABLET | Refills: 5 | Status: SHIPPED | OUTPATIENT
Start: 2022-07-20

## 2022-07-20 RX ORDER — CIPROFLOXACIN AND DEXAMETHASONE 3; 1 MG/ML; MG/ML
4 SUSPENSION/ DROPS AURICULAR (OTIC) 2 TIMES DAILY
Qty: 7.5 ML | Refills: 0 | Status: SHIPPED
Start: 2022-07-20 | End: 2022-07-20

## 2022-07-20 RX ORDER — CIPROFLOXACIN AND DEXAMETHASONE 3; 1 MG/ML; MG/ML
4 SUSPENSION/ DROPS AURICULAR (OTIC) 2 TIMES DAILY
Qty: 7.5 ML | Refills: 0 | Status: SHIPPED | OUTPATIENT
Start: 2022-07-20 | End: 2022-07-27

## 2022-07-20 RX ORDER — SERTRALINE HYDROCHLORIDE 100 MG/1
100 TABLET, FILM COATED ORAL DAILY
Qty: 30 TABLET | Refills: 11 | Status: SHIPPED | OUTPATIENT
Start: 2022-07-20

## 2022-07-20 RX ORDER — OMEGA-3 FATTY ACIDS/FISH OIL 300-1000MG
CAPSULE ORAL PRN
COMMUNITY

## 2022-07-20 RX ORDER — DOCUSATE SODIUM 100 MG/1
CAPSULE, LIQUID FILLED ORAL
COMMUNITY
Start: 2022-05-26

## 2022-07-20 ASSESSMENT — ENCOUNTER SYMPTOMS
ABDOMINAL PAIN: 0
TROUBLE SWALLOWING: 0
SHORTNESS OF BREATH: 0

## 2022-07-20 NOTE — PROGRESS NOTES
Wilber Collins (:  1995) is a 32 y.o. male, established patient follow up , here for evaluation of the following:  No chief complaint on file. ASSESSMENT/PLAN      1. Anxiety  Chronic, now better controlled, intermittent use of the hydroxyzine for sleep or anxiety as needed, will increase the Zoloft to 100 mg, no signs of rakan, doing much better at work, at home, and in the office today has less anxiety over medical facts  -     sertraline (ZOLOFT) 100 MG tablet; Take 1 tablets by mouth in the morning., Disp-30 tablet, R-5Normal  2. Other infective acute otitis externa of left ear  -     ciprofloxacin-dexamethasone (CIPRODEX) 0.3-0.1 % otic suspension; Place 4 drops into the left ear in the morning and 4 drops before bedtime. Do all this for 7 days. , Disp-7.5 mL, R-0Normal  3. Indigestion  Chronic, well controlled, continue current medications and treatment plan    -     pantoprazole (PROTONIX) 40 MG tablet; Take 1 tablet by mouth every morning (before breakfast), Disp-30 tablet, R-5Normal  4. WPW (Emerson-Parkinson-White syndrome)  Chronic, notes he has had this corrected  No follow-ups on file. Subjective   SUBJECTIVE/OBJECTIVE:  HPI  Patient is here for follow-up. He feels that the Zoloft is working well. He is better able to communicate at work. He is having less anxiety. He is sleeping better. Things are going better at home. He would like to increase dose however she feels he is still having some anxiety. Less panic attacks. He uses hydroxyzine as needed for sleep anxiety. He did have surgery and had setan removed with [facility], notes are in the chart under care everywhere    He also had repeat CMP, liver enzymes are getting better, discussed this may be due to his improvement of his lifestyle, can consider further work-up in the future if they do not correct    Also had WPW and had surgery to correct.      Declined preventative screening identified as care gaps unless ordered through this visit    PHQ2/PHQ9      No data recorded     Past Medical History:  has a past medical history of Emerson-Parkinson-White (WPW) syndrome. Past Surgical History:  has a past surgical history that includes Atrial ablation surgery. Social History:  reports that he has never smoked. He has never used smokeless tobacco. He reports current alcohol use. He reports that he does not use drugs. Family History: family history is not on file. Allergies: Patient has no known allergies. Medications:   Current Outpatient Medications   Medication Sig Dispense Refill     MG capsule TAKE 1 CAPSULE BY MOUTH TWICE DAILY      ibuprofen (ADVIL;MOTRIN) 200 MG CAPS Take by mouth as needed      pantoprazole (PROTONIX) 40 MG tablet Take 1 tablet by mouth every morning (before breakfast) 30 tablet 5    sertraline (ZOLOFT) 50 MG tablet Take 2 tablets by mouth in the morning. 30 tablet 5    ciprofloxacin-dexamethasone (CIPRODEX) 0.3-0.1 % otic suspension Place 4 drops into the left ear in the morning and 4 drops before bedtime. Do all this for 7 days. 7.5 mL 0     No current facility-administered medications for this visit. Allergies: Patient has no known allergies. Review of Systems   Constitutional:  Negative for activity change, appetite change, fatigue, fever and unexpected weight change. HENT:  Positive for ear pain. Negative for trouble swallowing. Eyes:  Negative for visual disturbance. Respiratory:  Negative for shortness of breath. Cardiovascular:  Negative for chest pain. Gastrointestinal:  Negative for abdominal pain. Musculoskeletal:  Negative for arthralgias. Neurological:  Negative for weakness, light-headedness and headaches. Psychiatric/Behavioral:  Negative for confusion and sleep disturbance. All other systems reviewed and are negative.        Objective   /78 (Site: Left Upper Arm, Position: Sitting, Cuff Size: Medium Adult)   Pulse 97   Temp 97.1 °F (36.2 °C) (Temporal) Resp 20   Ht 6' (1.829 m)   Wt 277 lb (125.6 kg)   SpO2 98%   BMI 37.57 kg/m²       No results found for: LABA1C  No results found for: CHOL  No results found for: TRIG  No results found for: HDL  No results found for: LDLCHOLESTEROL, LDLCALC  No results found for: LABVLDL, VLDL  No results found for: CHOLHDLRATIO   CREATININE   Date Value Ref Range Status   04/04/2021 1.0 0.7 - 1.2 mg/dL Final   06/29/2017 0.9 0.7 - 1.2 mg/dL Final   11/29/2016 1.2 0.7 - 1.2 mg/dL Final       The ASCVD Risk score (Vaishnavi Bucio., et al., 2013) failed to calculate for the following reasons: The 2013 ASCVD risk score is only valid for ages 36 to 78     Physical Exam  Constitutional:       General: He is not in acute distress. Appearance: Normal appearance. HENT:      Head: Normocephalic and atraumatic. Right Ear: External ear normal.      Left Ear: External ear normal.      Ears:      Comments: Right ear canal with erythema     Nose: Nose normal.      Mouth/Throat:      Mouth: Mucous membranes are moist.   Eyes:      Extraocular Movements: Extraocular movements intact. Conjunctiva/sclera: Conjunctivae normal.   Cardiovascular:      Rate and Rhythm: Normal rate and regular rhythm. Heart sounds: No murmur heard. Pulmonary:      Effort: Pulmonary effort is normal.      Breath sounds: Normal breath sounds. No wheezing. Musculoskeletal:         General: Normal range of motion. Neurological:      General: No focal deficit present. Mental Status: He is alert. Psychiatric:         Mood and Affect: Mood normal.         Behavior: Behavior normal.           An electronic signature was used to authenticate this note. --Sue Correa MD       *NOTE: This report was transcribed using voice recognition software. Every effort was made to ensure accuracy; however, inadvertent computerized transcription errors may be present.

## 2022-08-09 ENCOUNTER — OFFICE VISIT (OUTPATIENT)
Dept: FAMILY MEDICINE CLINIC | Age: 27
End: 2022-08-09
Payer: COMMERCIAL

## 2022-08-09 VITALS
HEART RATE: 98 BPM | SYSTOLIC BLOOD PRESSURE: 108 MMHG | HEIGHT: 72 IN | BODY MASS INDEX: 37.25 KG/M2 | WEIGHT: 275 LBS | TEMPERATURE: 97 F | DIASTOLIC BLOOD PRESSURE: 73 MMHG | OXYGEN SATURATION: 99 % | RESPIRATION RATE: 18 BRPM

## 2022-08-09 DIAGNOSIS — M54.41 ACUTE RIGHT-SIDED LOW BACK PAIN WITH RIGHT-SIDED SCIATICA: Primary | ICD-10-CM

## 2022-08-09 PROCEDURE — 99213 OFFICE O/P EST LOW 20 MIN: CPT | Performed by: STUDENT IN AN ORGANIZED HEALTH CARE EDUCATION/TRAINING PROGRAM

## 2022-08-09 PROCEDURE — 96372 THER/PROPH/DIAG INJ SC/IM: CPT | Performed by: STUDENT IN AN ORGANIZED HEALTH CARE EDUCATION/TRAINING PROGRAM

## 2022-08-09 RX ORDER — DEXAMETHASONE SODIUM PHOSPHATE 10 MG/ML
10 INJECTION INTRAMUSCULAR; INTRAVENOUS ONCE
Status: COMPLETED | OUTPATIENT
Start: 2022-08-09 | End: 2022-08-09

## 2022-08-09 RX ORDER — MELOXICAM 15 MG/1
15 TABLET ORAL DAILY PRN
Qty: 30 TABLET | Refills: 0 | Status: SHIPPED | OUTPATIENT
Start: 2022-08-09

## 2022-08-09 RX ORDER — CYCLOBENZAPRINE HCL 10 MG
10 TABLET ORAL 3 TIMES DAILY PRN
Qty: 30 TABLET | Refills: 0 | Status: SHIPPED | OUTPATIENT
Start: 2022-08-09 | End: 2022-08-19

## 2022-08-09 RX ORDER — DEXAMETHASONE SODIUM PHOSPHATE 10 MG/ML
10 INJECTION, EMULSION INTRAMUSCULAR; INTRAVENOUS ONCE
Status: DISCONTINUED | OUTPATIENT
Start: 2022-08-09 | End: 2022-08-09

## 2022-08-09 RX ADMIN — DEXAMETHASONE SODIUM PHOSPHATE 10 MG: 10 INJECTION INTRAMUSCULAR; INTRAVENOUS at 08:59

## 2022-08-09 ASSESSMENT — ENCOUNTER SYMPTOMS
TROUBLE SWALLOWING: 0
SHORTNESS OF BREATH: 0
ABDOMINAL PAIN: 0
BACK PAIN: 1

## 2022-08-09 NOTE — PROGRESS NOTES
Heide Graves (:  1995) is a 32 y.o. male, established patient follow up , here for evaluation of the following:  Back Pain (Right sided that goes down right leg/)         ASSESSMENT/PLAN      1. Acute right-sided low back pain with right-sided sciatica    Chronic, acute exacerbation, right-sided back pain with sciatic down right leg likely secondary to his driving, recommended weight loss, core and back strengthening program, will give Decadron today, cyclobenzaprine and meloxicam as needed going forward, if he does not improve can consider imaging and physical therapy, thoroughly discussed that at his young age it is very important to keep back in healthy, maintain a healthy weight, to preserve his back function as he ages    -     dexamethasone (PF) (DECADRON) injection 10 mg; 10 mg, IntraMUSCular, ONCE, 1 dose, On 22 at 0845  -     cyclobenzaprine (FLEXERIL) 10 MG tablet; Take 1 tablet by mouth 3 times daily as needed for Muscle spasms, Disp-30 tablet, R-0Normal  -     meloxicam (MOBIC) 15 MG tablet; Take 1 tablet by mouth daily as needed for Pain, Disp-30 tablet, R-0Normal  Return in about 4 weeks (around 2022) for follow up back pain. Subjective   SUBJECTIVE/OBJECTIVE:  HPI    He has low back pain, radiates right lower back down right leg around ankle. Has happened before, steroids helped. Sometimes anterior thigh pain. He has been trying ibuprofen. He notes he thinks sitting a lot has caused pain. Declined preventative screening identified as care gaps unless ordered through this visit    PHQ2/PHQ9      No data recorded     Past Medical History:  has a past medical history of Emerson-Parkinson-White (WPW) syndrome. Past Surgical History:  has a past surgical history that includes Atrial ablation surgery. Social History:  reports that he has never smoked. He has never used smokeless tobacco. He reports current alcohol use. He reports that he does not use drugs.   Family History: family history is not on file. Allergies: Patient has no known allergies. Medications:   Current Outpatient Medications   Medication Sig Dispense Refill    cyclobenzaprine (FLEXERIL) 10 MG tablet Take 1 tablet by mouth 3 times daily as needed for Muscle spasms 30 tablet 0    meloxicam (MOBIC) 15 MG tablet Take 1 tablet by mouth daily as needed for Pain 30 tablet 0     MG capsule TAKE 1 CAPSULE BY MOUTH TWICE DAILY      ibuprofen (ADVIL;MOTRIN) 200 MG CAPS Take by mouth as needed      pantoprazole (PROTONIX) 40 MG tablet Take 1 tablet by mouth every morning (before breakfast) 30 tablet 5    sertraline (ZOLOFT) 100 MG tablet Take 1 tablet by mouth in the morning. 30 tablet 11     Current Facility-Administered Medications   Medication Dose Route Frequency Provider Last Rate Last Admin    dexamethasone (PF) (DECADRON) injection 10 mg  10 mg IntraMUSCular Once Paul Monsalve MD          Allergies: Patient has no known allergies. Review of Systems   Constitutional:  Negative for activity change, appetite change, fatigue, fever and unexpected weight change. HENT:  Negative for trouble swallowing. Eyes:  Negative for visual disturbance. Respiratory:  Negative for shortness of breath. Cardiovascular:  Negative for chest pain. Gastrointestinal:  Negative for abdominal pain. Musculoskeletal:  Positive for back pain. Negative for arthralgias. Neurological:  Negative for weakness, light-headedness and headaches. Psychiatric/Behavioral:  Negative for confusion and sleep disturbance. All other systems reviewed and are negative.        Objective   /73 (Site: Right Upper Arm, Position: Sitting, Cuff Size: Large Adult)   Pulse 98   Temp 97 °F (36.1 °C) (Temporal)   Resp 18   Ht 6' (1.829 m)   Wt 275 lb (124.7 kg)   SpO2 99%   BMI 37.30 kg/m²       No results found for: LABA1C  No results found for: CHOL  No results found for: TRIG  No results found for: HDL  No results found for: LDLCHOLESTEROL, LDLCALC  No results found for: LABVLDL, VLDL  No results found for: CHOLHDLRATIO   Creatinine   Date Value Ref Range Status   04/04/2021 1.0 0.7 - 1.2 mg/dL Final   06/29/2017 0.9 0.7 - 1.2 mg/dL Final   11/29/2016 1.2 0.7 - 1.2 mg/dL Final       The ASCVD Risk score (Justin Rizzo., et al., 2013) failed to calculate for the following reasons: The 2013 ASCVD risk score is only valid for ages 36 to 78     Physical Exam  Constitutional:       General: He is not in acute distress. Appearance: Normal appearance. HENT:      Head: Normocephalic and atraumatic. Eyes:      Extraocular Movements: Extraocular movements intact. Pulmonary:      Effort: Pulmonary effort is normal.   Musculoskeletal:      Comments: Pain in the right lower back on palpation, negative straight leg testing, no pain along the medial spine, full range of motion of back, neurovascularly intact   Neurological:      Mental Status: He is alert. An electronic signature was used to authenticate this note. --Jaylene Ford MD       *NOTE: This report was transcribed using voice recognition software. Every effort was made to ensure accuracy; however, inadvertent computerized transcription errors may be present.

## 2022-08-09 NOTE — PATIENT INSTRUCTIONS
8 Yoga Poses to Relieve Lower Back Pain  A little gentle stretching can make a big impact. By Noel Dailey C.P.T. The lower back is a sensitive spot for many people. While there can be a ton of causes of lower back pain, a weak core and poor posture from sitting all day (and consequently shortening the hip muscles that then pull on the lower back) are two really common contributing factors to lower back aches and discomfort. It's always important to figure out what's causing pain so you can address it and prevent it from happening again. But in most situations, doing some gentle yoga can help relieve tightness and give your lower back some relief. Mora Hanks is great for working on flexibility and core stability, correcting posture, and breathing--all of which are necessary for a healthy back,\" Sabrina Sigala PDAMIR., D.P.T., O.C.S., clinical director at [facility] in [place], [place], tells [employer]. She adds that yoga is safe to do daily. It's important, though, to make sure you're in tune with your body and stop doing anything that makes your discomfort worse. \"Never stretch into a position of pain. Pain is how our bodies tell us something is wrong. If it actually hurts, ease up on the stretch. \"  If you have any history of lower back injuries, problems with your discs, or experience pain that lasts more than 72 hours without improving, Jovon suggests seeing a physical therapist before doing any exercises. If you have an issue that requires medical attention, it's best to address it before it becomes worse. If your lower back pain is more of a general achiness or discomfort, it's worth trying some yoga stretches to address any tightness and alignment issues. We asked [facility]  Indy Sanchez to suggest and demo some of her favorite yoga stretches for lower back pain relief.  She recommends doing the stretches below as a flow, holding each pose for anywhere from one to three minutes. \"As long as it feels good, then do it all,\" she says. Here are the stretches she recommends:  Child's Pose  Cat/Cow  Downward Facing Dog  Standing Forward Bend  Sphinx Pose  Knees to Chest With Slow Rock  Reclined [employer]  Reclined Supine Twist  And here's how to do each one:      1  Child's Pose -- 1 to 3 minutes  \"Child's Pose takes the pressure off your lower back by elongating and aligning the spine, which decompresses it and gives you a nice stretch,\" Lizzie Box says. Kneel on your mat with your knees hip width apart and your feet together behind you. Take a deep breath in, and as you exhale, lay your torso over your thighs. Try to lengthen your neck and spine by drawing your ribs away from your tailbone and the crown of your head away from your shoulders. Rest your forehead on the ground, with your arms extended out in front of you. Hold for one to three minutes. 2  Cat/Cow -- 1 to 3 minutes  \"This is probably my personal favorite stretch for my back,\" Lizzie Box says. It allows for a nice flexion and extension of the spine, promotes mobility, and \"it also helps to just relieve any tension in the lower back. \" Cat/Cow also helps you get familiar with what your neutral spine is--not too arched and not too rounded--which can help improve posture. Start on all fours with your shoulders over your wrists and hips over knees. Take a slow inhale, and on the exhale, round your spine and drop your head toward the floor (this is the cat posture). Inhale and lift your head, chest, and tailbone toward the ceiling as you arch your back for cow.   Do this for one to three minutes. 3  Downward Facing Dog -- 1 to 3 minutes  \"Sometimes, we feel lower back pains because the backs of our legs are so tight,\" Lizzie Box explains. Down Dog is a great way to stretch out your hamstrings and calves.  If you're extra tight, you can bend your knees a little bit to make the stretch more comfortable. From Child's Pose, keep your hands on the floor, sit up on your knees, and then lift your butt and press back into Downward Facing Dog. Spread your fingers wide. Work on straightening your legs and lowering your heels toward the ground. Relax your head between your arms, and direct your gaze through your legs or up toward your belly button. Hold for one to three minutes. 4  Standing Forward Bend  This stretch also stretches out the backs of the legs and lengthens the spine, both of which relieve the lower back. Modify by keeping the knees slightly bent if straightening your legs hurts your back, Bhargavi Grayson suggests. From Downward Facing Dog, slowly step forward to the top of your mat. Stand with your feet shoulder width apart. Straighten your legs out as much as you can and let your torso hang down. Tuck your chin in toward your chest, relax your shoulders, and extend the crown of your head toward the floor to create a long spine. Hold for one to three minutes. Pro tip from [place]: \"Try thinking about keeping your butt sticking out during this move so that the bend comes from your hips, not your back. \"      5  Sphinx Pose -- 1 to 3 minutes  \"Sphinx pose creates a nice natural curve of the lower back,\" Bhargavi Grayson says. It also engages your abs a bit, which is helpful for supporting the lower back. Lie on your stomach, legs together and straight out behind you. Place your elbows under your shoulders and your forearms on the floor as you lift your chest up off the floor. Press your hips and thighs into the floor, and think about lengthening your spine while keeping your shoulders relaxed. Sit up just enough to feel a nice stretch in your lower back. Don't hyperextend, and stop immediately if you start to feel any discomfort or pain. Hold this position for one to three minutes.   Cyrelson suggests tucking your tailbone under and pulling your belly button in toward your spine to minimize any hyperextension of the back. 6  Knees to Chest With Slow Rock -- 1 to 3 minutes  Melania Ga says that she likes to add a slow rocking motion to this basic stretch because it \"gives you a nice, natural body weight massage. \"  Lie on your back. Hug both knees into your chest.  Slowly rock your torso back and forth while firmly holding onto your legs. Do this for one to three minutes. 7  Reclined [place] Pose -- 1 to 3 minutes, each leg  This moves, also known as \"figure-four,\" stretches the hips, butt, and inner thighs, Melania Ga says. Lie on your back. Cross your left foot over your right quad, and bend your right knee. Hold the back of your right leg and gently pull it toward your chest.  When you feel a comfortable stretch, hold there for one to three minutes. Switch sides and repeat. 8  Reclined Supine Twist -- 1 to 3 minutes  Melania Ga says that this is a great stretch for the lower back, and can provide some pain relief if you're tight. For some people, though, twisting movements can irritate the lower back. If this stretch starts to hurt, stop doing it. You can also try putting a towel underneath your knees to help you ease into it if you're super tight, she says. Lie on your back. Aliisa Drier your knees into your chest. Then, drop both knees over to one side as you twist your torso in the opposite direction. Try to keep your knees and hips in line with each other as you draw them toward the floor, and keep your chest as square to the ceiling as you can. Hold this stretch for one to three minutes, and then repeat on the other side.

## 2022-09-14 ENCOUNTER — TELEPHONE (OUTPATIENT)
Dept: FAMILY MEDICINE CLINIC | Age: 27
End: 2022-09-14

## 2023-01-03 ENCOUNTER — OFFICE VISIT (OUTPATIENT)
Dept: PRIMARY CARE CLINIC | Age: 28
End: 2023-01-03
Payer: COMMERCIAL

## 2023-01-03 VITALS — TEMPERATURE: 97.1 F | SYSTOLIC BLOOD PRESSURE: 120 MMHG | HEART RATE: 88 BPM | DIASTOLIC BLOOD PRESSURE: 81 MMHG

## 2023-01-03 DIAGNOSIS — R05.8 PRODUCTIVE COUGH: ICD-10-CM

## 2023-01-03 DIAGNOSIS — J06.9 ACUTE URI: Primary | ICD-10-CM

## 2023-01-03 LAB
INFLUENZA A ANTIGEN, POC: NEGATIVE
INFLUENZA B ANTIGEN, POC: NEGATIVE
Lab: NORMAL
PERFORMING INSTRUMENT: NORMAL
QC PASS/FAIL: NORMAL
SARS-COV-2, POC: NORMAL

## 2023-01-03 PROCEDURE — 87804 INFLUENZA ASSAY W/OPTIC: CPT | Performed by: NURSE PRACTITIONER

## 2023-01-03 PROCEDURE — 87426 SARSCOV CORONAVIRUS AG IA: CPT | Performed by: NURSE PRACTITIONER

## 2023-01-03 PROCEDURE — 99213 OFFICE O/P EST LOW 20 MIN: CPT | Performed by: NURSE PRACTITIONER

## 2023-01-03 RX ORDER — BROMPHENIRAMINE MALEATE, PSEUDOEPHEDRINE HYDROCHLORIDE, AND DEXTROMETHORPHAN HYDROBROMIDE 2; 30; 10 MG/5ML; MG/5ML; MG/5ML
5 SYRUP ORAL 4 TIMES DAILY PRN
Qty: 118 ML | Refills: 0 | Status: SHIPPED | OUTPATIENT
Start: 2023-01-03

## 2023-01-03 RX ORDER — AZITHROMYCIN 250 MG/1
250 TABLET, FILM COATED ORAL SEE ADMIN INSTRUCTIONS
Qty: 6 TABLET | Refills: 0 | Status: SHIPPED | OUTPATIENT
Start: 2023-01-03 | End: 2023-01-08

## 2023-01-27 ENCOUNTER — TELEPHONE (OUTPATIENT)
Dept: FAMILY MEDICINE CLINIC | Age: 28
End: 2023-01-27

## 2023-01-27 DIAGNOSIS — K30 INDIGESTION: ICD-10-CM

## 2023-01-27 RX ORDER — PANTOPRAZOLE SODIUM 40 MG/1
40 TABLET, DELAYED RELEASE ORAL
Qty: 30 TABLET | Refills: 5 | Status: SHIPPED | OUTPATIENT
Start: 2023-01-27

## 2023-08-21 DIAGNOSIS — F41.9 ANXIETY: ICD-10-CM

## 2023-08-22 RX ORDER — SERTRALINE HYDROCHLORIDE 100 MG/1
100 TABLET, FILM COATED ORAL DAILY
Qty: 30 TABLET | Refills: 11 | Status: SHIPPED | OUTPATIENT
Start: 2023-08-22

## 2023-09-07 ENCOUNTER — PATIENT MESSAGE (OUTPATIENT)
Dept: FAMILY MEDICINE CLINIC | Age: 28
End: 2023-09-07

## 2024-12-19 DIAGNOSIS — F41.9 ANXIETY: ICD-10-CM

## 2024-12-19 RX ORDER — SERTRALINE HYDROCHLORIDE 100 MG/1
100 TABLET, FILM COATED ORAL DAILY
Qty: 30 TABLET | Refills: 1 | Status: SHIPPED | OUTPATIENT
Start: 2024-12-19

## 2024-12-19 NOTE — TELEPHONE ENCOUNTER
Name of Medication(s) Requested:  sertraline (ZOLOFT) 100 MG tablet   Pt is now living in [place] and he has an appointment on 12/26, but he only has 2 pills left and would like a week to hold him until the appointment.        Medication is on current medication list Yes    Dosage and directions were verified? Yes    Quantity verified: only a week     Pharmacy Verified?  Yes - [facility] - [address] [place]  [address] - [phone]    Last Appointment:  8/9/2022    Future appts:  No future appointments.     (If no appt send self scheduling link. .REFILLAPPT)  Scheduling request sent?     [] Yes  [] No    Does patient need updated?  [] Yes  [] No

## 2025-03-01 DIAGNOSIS — F41.9 ANXIETY: ICD-10-CM

## 2025-03-03 RX ORDER — SERTRALINE HYDROCHLORIDE 100 MG/1
100 TABLET, FILM COATED ORAL DAILY
Qty: 30 TABLET | Refills: 1 | OUTPATIENT
Start: 2025-03-03

## 2025-03-03 NOTE — TELEPHONE ENCOUNTER
Name of Medication(s) Requested:  Requested Prescriptions     Pending Prescriptions Disp Refills    sertraline (ZOLOFT) 100 MG tablet [Pharmacy Med Name: SERTRALINE  MG TABLET] 30 tablet 1     Sig: TAKE 1 TABLET BY MOUTH EVERY DAY       Medication is on current medication list Yes    Dosage and directions were verified? Yes    Quantity verified: 30 day supply     Pharmacy Verified?  Yes    Last Appointment:  8/9/2022    Future appts:  No future appointments.     (If no appt send self scheduling link. .REFILLAPPT)  Scheduling request sent?     [] Yes  [x] No    Does patient need updated?  [] Yes  [x] No